# Patient Record
Sex: FEMALE | Race: WHITE | URBAN - METROPOLITAN AREA
[De-identification: names, ages, dates, MRNs, and addresses within clinical notes are randomized per-mention and may not be internally consistent; named-entity substitution may affect disease eponyms.]

---

## 2018-12-13 ENCOUNTER — OFFICE VISIT (OUTPATIENT)
Dept: ORTHOPEDIC SURGERY | Age: 68
End: 2018-12-13

## 2018-12-13 VITALS
HEIGHT: 70 IN | OXYGEN SATURATION: 95 % | DIASTOLIC BLOOD PRESSURE: 74 MMHG | WEIGHT: 144.6 LBS | HEART RATE: 76 BPM | RESPIRATION RATE: 16 BRPM | TEMPERATURE: 98 F | SYSTOLIC BLOOD PRESSURE: 132 MMHG | BODY MASS INDEX: 20.7 KG/M2

## 2018-12-13 DIAGNOSIS — M79.18 MYOFASCIAL PAIN: ICD-10-CM

## 2018-12-13 DIAGNOSIS — M47.816 LUMBAR SPONDYLOSIS: Primary | ICD-10-CM

## 2018-12-13 DIAGNOSIS — M35.3 PMR (POLYMYALGIA RHEUMATICA) (HCC): ICD-10-CM

## 2018-12-13 DIAGNOSIS — M51.36 BULGE OF LUMBAR DISC WITHOUT MYELOPATHY: ICD-10-CM

## 2018-12-13 RX ORDER — DEXTROMETHORPHAN HYDROBROMIDE, GUAIFENESIN 5; 100 MG/5ML; MG/5ML
650 LIQUID ORAL EVERY 8 HOURS
COMMUNITY

## 2018-12-13 RX ORDER — FLUTICASONE PROPIONATE 50 MCG
2 SPRAY, SUSPENSION (ML) NASAL DAILY
COMMUNITY

## 2018-12-13 RX ORDER — PREDNISONE 20 MG/1
15 TABLET ORAL
COMMUNITY

## 2018-12-13 NOTE — PATIENT INSTRUCTIONS
Low Back Arthritis: Exercises  Your Care Instructions  Here are some examples of typical rehabilitation exercises for your condition. Start each exercise slowly. Ease off the exercise if you start to have pain. Your doctor or physical therapist will tell you when you can start these exercises and which ones will work best for you. When you are not being active, find a comfortable position for rest. Some people are comfortable on the floor or a medium-firm bed with a small pillow under their head and another under their knees. Some people prefer to lie on their side with a pillow between their knees. Don't stay in one position for too long. Take short walks (10 to 20 minutes) every 2 to 3 hours. Avoid slopes, hills, and stairs until you feel better. Walk only distances you can manage without pain, especially leg pain. How to do the exercises  Pelvic tilt    1. Lie on your back with your knees bent. 2. \"Brace\" your stomach--tighten your muscles by pulling in and imagining your belly button moving toward your spine. 3. Press your lower back into the floor. You should feel your hips and pelvis rock back. 4. Hold for 6 seconds while breathing smoothly. 5. Relax and allow your pelvis and hips to rock forward. 6. Repeat 8 to 12 times. Back stretches    1. Get down on your hands and knees on the floor. 2. Relax your head and allow it to droop. Round your back up toward the ceiling until you feel a nice stretch in your upper, middle, and lower back. Hold this stretch for as long as it feels comfortable, or about 15 to 30 seconds. 3. Return to the starting position with a flat back while you are on your hands and knees. 4. Let your back sway by pressing your stomach toward the floor. Lift your buttocks toward the ceiling. 5. Hold this position for 15 to 30 seconds. 6. Repeat 2 to 4 times. Follow-up care is a key part of your treatment and safety.  Be sure to make and go to all appointments, and call your doctor if you are having problems. It's also a good idea to know your test results and keep a list of the medicines you take. Where can you learn more? Go to http://murali-irina.info/. Enter H044 in the search box to learn more about \"Low Back Arthritis: Exercises. \"  Current as of: November 29, 2017  Content Version: 11.8  © 4156-9454 [employer]. Care instructions adapted under license by [employer] (which disclaims liability or warranty for this information). If you have questions about a medical condition or this instruction, always ask your healthcare professional. [address] any warranty or liability for your use of this information.

## 2018-12-13 NOTE — PROGRESS NOTES
[address] [place] [address] [facility]  [address] [place], [address]  Phone: [phone]  Fax: [phone]    NEW PATIENT  Pt's YOB: 1950    ASSESSMENT   Diagnoses and all orders for this visit:    1. Lumbar spondylosis    2. PMR (polymyalgia rheumatica) ([facility])    3. Myofascial pain    4. Bulge of lumbar disc without myelopathy         IMPRESSION AND PLAN:  Nicole Main is a  female with history of lumbar pain. She was diagnosed with polymyalgia rheumatica by Dr. Ani Trevino and reports substantial improvement since starting prednisone 20 mg 3 weeks ago. Prior to starting the prednisone she tried physical therapy with occasional increased pain after sessions. 1) Pt was given information on lumbar arthritis exercises. 2) She was given information on how to use a TheraCane. 3) I recommended the patient try water exercise and encouraged her to continue with yoga. 4) Ms. Mary Umana has a reminder for a \"due or due soon\" health maintenance. I have asked that she contact her primary care provider, Amaryllis Burkitt, DO, for follow-up on this health maintenance. 5)  demonstrated consistency with prescribing. 6) Pt will follow-up in 6 weeks or sooner if needed. HISTORY OF PRESENT ILLNESS:  Nicole Main is a  female with history of lumbar pain. Pt presents to the office today as a new patient referred by Amaryllis Burkitt, DO. She reports lower back pain since the beginning of 2018. She notes that her pain started in the posterior aspect of both knees. She then noticed the onset of pain in both hips and in the lower back that was worse when sitting for prolonged periods of time. Pt notes that she was diagnosed with polymyalgia rheumatica by Dr. Ani Trevino and was placed on prednisone 20 mg 3 weeks ago. She notes significant improvement with the prednisone and will follow up with Dr. Ani Trevino tomorrow. Pt states that today has been one of her best days.  She notes that prior to starting the prednisone she had difficulty with overhead motion. Pt states that her  had to put all her dishes on the counter since she had pain when reaching in her kitchen cabinets. She notes that she was sleeping on her couch due to pain when changing position. Pt was also unable to get out of a car without severe pain. She tried physical therapy but notes that occasionally her pain was worse after sessions. Pt reports benefit with moist head. Of note, she had prior cervical surgery with Dr. Daniel Lang in 2006. Pt notes that she performs yoga and generally walks 3,500-5,000 steps a day. She has been followed by Dr. Ang Barcenas for breast cancer and notes a history of osteopenia. She reports intermittent headaches and admits to recent jaw pain. Pt denies any changes in vision. Pt at this time desires to proceed with information on how to use a TheraCane. Pain Scale: 1/10     PCP: Keena Tatum DO    Past Medical History:   Diagnosis Date    Arthritis     Polymyalgia rheumatica    Cancer ([address]     Breast Cancer - in remission currently    Kidney disease         Social History     Socioeconomic History    Marital status:      Spouse name: Not on file    Number of children: Not on file    Years of education: Not on file    Highest education level: Not on file   Social Needs    Financial resource strain: Not on file    Food insecurity - worry: Not on file    Food insecurity - inability: Not on file    Transportation needs - medical: Not on file   [employer] needs - non-medical: Not on file   Occupational History    Not on file   Tobacco Use    Smoking status: Never Smoker   Substance and Sexual Activity    Alcohol use:  Yes    Drug use: Not on file    Sexual activity: Not on file   Other Topics Concern     Service Not Asked    Blood Transfusions Not Asked    Caffeine Concern Not Asked    Occupational Exposure Not Asked    Hobby Hazards Not Asked    Sleep Concern Not Asked    Stress Concern Not Asked    Weight Concern Not Asked    Special Diet Not Asked    Back Care Not Asked    Exercise Not Asked    Bike Helmet Not Asked   [address] Not Asked    Self-Exams Not Asked   Social History Narrative    Not on file       Current Outpatient Medications   Medication Sig Dispense Refill    predniSONE (DELTASONE) 20 mg tablet Take 20 mg by mouth daily (with breakfast).  ubidecarenone/vitamin E mixed (COQ10  PO) Take  by mouth.  cyanocobalamin, vitamin B-12, (B-12 KIT) 1,000 mcg/mL kit by Injection route.  polyethylene glycol 3350 (CLEARLAX PO) Take  by mouth as needed.  acetaminophen (TYLENOL ARTHRITIS PAIN) 650 mg TbER Take 650 mg by mouth every eight (8) hours.  calcium carbonate-simethicone 750-80 mg chew Take  by mouth.  fluticasone (FLONASE ALLERGY RELIEF) 50 mcg/actuation nasal spray 2 Sprays by Both Nostrils route daily.  denosumab (PROLIA) 60 mg/mL injection 60 mg by SubCUTAneous route.  sodium chloride (ONUR 128) 2 % ophthalmic solution Administer 1 Drop to both eyes as needed.  PITAVASTATIN CALCIUM (LIVALO PO) Take  by mouth.  EXEMESTANE PO Take  by mouth.  MONTELUKAST SODIUM (SINGULAIR PO) Take  by mouth.  TRAZODONE HCL (TRAZODONE PO) Take  by mouth.  ibandronate (BONIVA) 150 mg tablet Take 150 mg by mouth every thirty (30) days.  Cholecalciferol, Vitamin D3, (VITAMIN D3) 1,000 unit cap Take  by mouth.  tamsulosin (FLOMAX) 0.4 mg capsule Take 1 Cap by mouth daily (after dinner). 30 Cap 0       Allergies   Allergen Reactions    Avelox [Moxifloxacin] Unknown (comments)    Estrogens Other (comments)     Estrogen positive    Percocet [Oxycodone-Acetaminophen] Nausea and Vomiting    Sulfa (Sulfonamide Antibiotics) Hives       REVIEW OF SYSTEMS    Constitutional: Negative for fever, chills, or weight change. Respiratory: Negative for cough or shortness of breath. Cardiovascular: Negative for chest pain or palpitations. Gastrointestinal: Negative for acid reflux, change in bowel habits, or constipation. Genitourinary: Negative for dysuria and flank pain. Musculoskeletal: Positive for lumbar pain. Skin: Negative for rash. Neurological: Negative for headaches, dizziness, or numbness. Endo/Heme/Allergies: Negative for increased bruising. Psychiatric/Behavioral: Negative for difficulty with sleep. PHYSICAL EXAMINATION  Visit Vitals  /74 (BP 1 Location: Right arm, BP Patient Position: Sitting)   Pulse 76   Temp 98 °F (36.7 °C) (Oral)   Resp 16   Ht 5' 10\" (1.778 m)   Wt 144 lb 9.6 oz (65.6 kg)   SpO2 95%   BMI 20.75 kg/m²       Constitutional: Awake, alert, and in no acute distress. HEENT: Normocephalic. Atraumatic. Oropharynx is moist and clear. PERRL. EOMI. Sclerae are nonicteric  Heart: Regular rate and rhythm  Lungs: Clear to auscultation bilaterally  Abdomen: Soft and nontender. Bowel sounds are present  Neurological: 1+ symmetrical DTRs in the upper extremities. 1+ symmetrical DTRs in the lower extremities. Sensation to light touch is intact. Negative Nancy's sign bilaterally. Skin: warm, dry, and intact. Musculoskeletal: Tight across the upper trapezii with scattered trigger points. Decreased range of motion with side to side cervical flexion. No pain with extension, axial loading, or forward flexion. No pain with internal or external rotation of her hips. Negative straight leg raise bilaterally.        Biceps  Triceps Deltoids Wrist Ext Wrist Flex Hand Intrin   Right +4/5 +4/5 +4/5 +4/5 +4/5 +4/5   Left +4/5 +4/5 +4/5 +4/5 +4/5 +4/5      Hip Flex  Quads Hamstrings Ankle DF EHL Ankle PF   Right +4/5 +4/5 +4/5 +4/5 +4/5 +4/5   Left +4/5 +4/5 +4/5 +4/5 +4/5 +4/5     IMAGING:    Lumbar spine MRI from 09/28/2018 was personally reviewed with the patient and demonstrated:  FINDINGS:    Alignment: There is retrolisthesis of L3 with respect L4 and L4 with respect L5 by 3 mm. Vertebral bodies: No compression fractures. L5-S1: Mild intervertebral disc desiccation is seen. A mild disc protrusion extends into the anterior epidural space and contributes to mild flattening of the ventral thecal sac. No displacement emerging S1 nerve roots noted. Mild bilateral facet arthropathy noted. The findings contribute to mild bilateral foraminal narrowing without central stenosis. L4-5: Retrolisthesis noted. Intervertebral disc desiccation. A mild to moderate diffuse disc protrusion contributes to flattening of the ventral thecal sac. Mild bilateral facet arthropathy noted in addition to flavum ligamentum hypertrophy. The findings contribute to mild to moderate bilateral foraminal stenosis and borderline mild central stenosis as the thecal sac measures 9-10 mm in the AP dimension. L3-4: Retrolisthesis noted. Mild intervertebral disc narrowing desiccation is seen. A mild to moderate diffuse disc protrusion contributes to flattening of the ventral thecal sac. Mild bilateral facet arthropathy noted. The findings contribute to mild to moderate bilateral foraminal stenosis without significant central stenosis. L2-3: MiId intervertebral disc narrowing and desiccation is seen. Mild disc bulging is notes. There is a moderate left posterolateral to lateral disc protrusion noted. The above findings extend into the anterior epidural space and contribute to flattening of the central thecal sac greatest anterolaterally on the left. The findings contribute to moderate left lateral recess stenosis. The findings contribute to mild to moderate left foraminal stenosis. No central spinal contralateral foraminal stenosis is seen. L1-2: Intervertebral disc narrowing and desiccation is notes. A moderate right posterolateral disc protrusion notes.  This extends into the anterior epidural space and contributes to flattening of the ventral thecal sac greatest anterolaterally on the right. The findings contribute to mild to moderate right lateral recess stenosis. No central spinal or foraminal stenosis is noted. Conus termination: L1-L2    Lower thoracic spine: Within normal limits. Upper sacrum: Within normal limits. Miscellaneous: Diverticular of the sigmoid colon noted suggesting sigmoid diverticulosis. IMPRESSION:  Disc degenerative changes of the lumbar spine described above in detail. Written by Graham Storm, as dictated by Lance Carrasco MD.  I, Dr. Lance Carrasco confirm that all documentation is accurate.

## 2019-01-24 ENCOUNTER — OFFICE VISIT (OUTPATIENT)
Dept: ORTHOPEDIC SURGERY | Age: 69
End: 2019-01-24

## 2019-01-24 VITALS
SYSTOLIC BLOOD PRESSURE: 123 MMHG | OXYGEN SATURATION: 96 % | DIASTOLIC BLOOD PRESSURE: 71 MMHG | TEMPERATURE: 97.9 F | BODY MASS INDEX: 21.33 KG/M2 | RESPIRATION RATE: 16 BRPM | HEIGHT: 70 IN | WEIGHT: 149 LBS | HEART RATE: 78 BPM

## 2019-01-24 DIAGNOSIS — Z85.3 HISTORY OF LEFT BREAST CANCER: ICD-10-CM

## 2019-01-24 DIAGNOSIS — M79.18 MYOFASCIAL PAIN: ICD-10-CM

## 2019-01-24 DIAGNOSIS — M35.3 PMR (POLYMYALGIA RHEUMATICA) (HCC): ICD-10-CM

## 2019-01-24 DIAGNOSIS — M47.816 LUMBAR SPONDYLOSIS: Primary | ICD-10-CM

## 2019-01-24 DIAGNOSIS — M85.80 OSTEOPENIA, UNSPECIFIED LOCATION: ICD-10-CM

## 2019-01-24 NOTE — PATIENT INSTRUCTIONS
Low Back Arthritis: Exercises  Your Care Instructions  Here are some examples of typical rehabilitation exercises for your condition. Start each exercise slowly. Ease off the exercise if you start to have pain. Your doctor or physical therapist will tell you when you can start these exercises and which ones will work best for you. When you are not being active, find a comfortable position for rest. Some people are comfortable on the floor or a medium-firm bed with a small pillow under their head and another under their knees. Some people prefer to lie on their side with a pillow between their knees. Don't stay in one position for too long. Take short walks (10 to 20 minutes) every 2 to 3 hours. Avoid slopes, hills, and stairs until you feel better. Walk only distances you can manage without pain, especially leg pain. How to do the exercises  Pelvic tilt    1. Lie on your back with your knees bent. 2. \"Brace\" your stomach--tighten your muscles by pulling in and imagining your belly button moving toward your spine. 3. Press your lower back into the floor. You should feel your hips and pelvis rock back. 4. Hold for 6 seconds while breathing smoothly. 5. Relax and allow your pelvis and hips to rock forward. 6. Repeat 8 to 12 times. Back stretches    1. Get down on your hands and knees on the floor. 2. Relax your head and allow it to droop. Round your back up toward the ceiling until you feel a nice stretch in your upper, middle, and lower back. Hold this stretch for as long as it feels comfortable, or about 15 to 30 seconds. 3. Return to the starting position with a flat back while you are on your hands and knees. 4. Let your back sway by pressing your stomach toward the floor. Lift your buttocks toward the ceiling. 5. Hold this position for 15 to 30 seconds. 6. Repeat 2 to 4 times. Follow-up care is a key part of your treatment and safety.  Be sure to make and go to all appointments, and call your doctor if you are having problems. It's also a good idea to know your test results and keep a list of the medicines you take. Where can you learn more? Go to http://murali-irina.info/. Enter T070 in the search box to learn more about \"Low Back Arthritis: Exercises. \"  Current as of: September 20, 2018  Content Version: 11.9  © 6422-7231 [employer]. Care instructions adapted under license by [employer] (which disclaims liability or warranty for this information). If you have questions about a medical condition or this instruction, always ask your healthcare professional. [address] any warranty or liability for your use of this information.

## 2019-01-24 NOTE — PROGRESS NOTES
[facility]  [address] [place], [address]  Phone: [phone]  Fax: [phone]    Pt's YOB: 1950    ASSESSMENT   Diagnoses and all orders for this visit:    1. Lumbar spondylosis    2. PMR (polymyalgia rheumatica) ([facility])    3. Myofascial pain    4. Osteopenia, unspecified location    5. History of left breast cancer        IMPRESSION AND PLAN:  Adrian Ramsay is a 79 y.o. female with history of lumbar pain and polymyalgia rheumatica. She notes that overall she is doing well. Pt decreased her prednisone from 20 mg to 15 mg daily since her last office visit. She uses ice and moist heat as needed and admits that she has started to wean herself off using a heating pad. Pt will follow up with her rheumatologist, Dr. Grecia Ambrosio next week. 1) Pt was given information on lumbar arthritis exercises. 2) She will continue taking prednisone as prescribed. 3) Discussed treatment options with the Pt, including steroid injections. 4) Ms. Steve Mireles has a reminder for a \"due or due soon\" health maintenance. I have asked that she contact her primary care provider, Fredna Goldberg, DO, for follow-up on this health maintenance. 5)  demonstrated consistency with prescribing. 6) Pt will follow-up as needed. HISTORY OF PRESENT ILLNESS:  Adrian Ramsay is a 79 y.o. female with history of lumbar pain and polymyalgia rheumatica. She notes that overall she is doing well at this time. Pt denies any pain radiating down the legs at this time. She decreased her prednisone from 20 mg to 15 mg daily since her last office visit. Pt notes a significant improvement in her stiffness since exercising along to exercise videos every morning. She also likes to perform yoga. Of note, her dad is on hospice care at [facility] and she admits that it takes a toll on her driving to and from [facility] everyday.  Pt notes that she had cut back on her housework and her  has been helping out more. She reports pain in the buttock a couple days and is unsure if this is weather related. Pt notes that she has been able to attend Buddhism again and often brings her own cushion/pillow. Pt notes that her pain was debilitating at its onset so she reports significant improvement overall. She uses ice and moist heat as needed and admits that she has started to wean herself off using a heating pad. Pt will follow up with her rheumatologist, Dr. Pradeep Solis, next week. She is also undergoing Prolia treatments, will have another Prolia injection tomorrow, and so far has had a total of 4 Prolia injections. Of note, she was on Boniva for quite some time prior to starting the Prolia injections. She takes Tylenol as needed with benefit. Pt at this time desires to continue with current care. More than 25 minute was spent with patient reviewing her symptoms, exercise regimen, medical follow up appointments, current treatment for osteoporosis, follow up for breast cancer and treatment for PMR. Pain Scale: 0 - No pain/10    PCP: Susana Quintero DO     Past Medical History:   Diagnosis Date    Arthritis     Polymyalgia rheumatica    Cancer ([facility] [address]     Breast Cancer - in remission currently    Kidney disease     PMR (polymyalgia rheumatica) ([facility])         Social History     Socioeconomic History    Marital status:      Spouse name: Not on file    Number of children: Not on file    Years of education: Not on file    Highest education level: Not on file   Social Needs    Financial resource strain: Not on file    Food insecurity - worry: Not on file    Food insecurity - inability: Not on file   [employer] needs - medical: Not on file   [employer] needs - non-medical: Not on file   Occupational History    Not on file   Tobacco Use    Smoking status: Never Smoker    Smokeless tobacco: Never Used   Substance and Sexual Activity    Alcohol use:  Yes    Drug use: Not on file    Sexual activity: Not on file   Other Topics Concern     Service Not Asked    Blood Transfusions Not Asked    Caffeine Concern Not Asked    Occupational Exposure Not Asked    Hobby Hazards Not Asked    Sleep Concern Not Asked    Stress Concern Not Asked    Weight Concern Not Asked    Special Diet Not Asked    Back Care Not Asked    Exercise Not Asked    Bike Helmet Not Asked   [address] Not Asked    Self-Exams Not Asked   Social History Narrative    Not on file       Current Outpatient Medications   Medication Sig Dispense Refill    predniSONE (DELTASONE) 20 mg tablet Take 15 mg by mouth daily (with breakfast).  cyanocobalamin, vitamin B-12, (B-12 KIT) 1,000 mcg/mL kit by Injection route.  acetaminophen (TYLENOL ARTHRITIS PAIN) 650 mg TbER Take 650 mg by mouth every eight (8) hours.  calcium carbonate-simethicone 750-80 mg chew Take  by mouth.  fluticasone (FLONASE ALLERGY RELIEF) 50 mcg/actuation nasal spray 2 Sprays by Both Nostrils route daily.  denosumab (PROLIA) 60 mg/mL injection 60 mg by SubCUTAneous route.  sodium chloride (ONUR 128) 2 % ophthalmic solution Administer 1 Drop to both eyes as needed.  PITAVASTATIN CALCIUM (LIVALO PO) Take  by mouth.  EXEMESTANE PO Take  by mouth.  MONTELUKAST SODIUM (SINGULAIR PO) Take  by mouth.  TRAZODONE HCL (TRAZODONE PO) Take  by mouth.  Cholecalciferol, Vitamin D3, (VITAMIN D3) 1,000 unit cap Take  by mouth.  ubidecarenone/vitamin E mixed (COQ10  PO) Take  by mouth.  polyethylene glycol 3350 (CLEARLAX PO) Take  by mouth as needed.  ibandronate (BONIVA) 150 mg tablet Take 150 mg by mouth every thirty (30) days.  tamsulosin (FLOMAX) 0.4 mg capsule Take 1 Cap by mouth daily (after dinner).  30 Cap 0       Allergies   Allergen Reactions    Avelox [Moxifloxacin] Unknown (comments)    Estrogens Other (comments)     Estrogen positive    Percocet [Oxycodone-Acetaminophen] Nausea and Vomiting    Sulfa (Sulfonamide Antibiotics) Hives         REVIEW OF SYSTEMS    Constitutional: Negative for fever, chills, or weight change. Respiratory: Negative for cough or shortness of breath. Cardiovascular: Negative for chest pain or palpitations. Gastrointestinal: Negative for acid reflux, change in bowel habits, or constipation. Genitourinary: Negative for dysuria and flank pain. Musculoskeletal: Positive for lumbar pain. Skin: Negative for rash. Neurological: Negative for headaches, dizziness, or numbness. Endo/Heme/Allergies: Negative for increased bruising. Psychiatric/Behavioral: Negative for difficulty with sleep. PHYSICAL EXAMINATION  Visit Vitals  /71   Pulse 78   Temp 97.9 °F (36.6 °C) (Oral)   Resp 16   Ht 5' 10\" (1.778 m)   Wt 149 lb (67.6 kg)   SpO2 96%   BMI 21.38 kg/m²       Constitutional: Awake, alert, and in no acute distress. Neurological: 1+ symmetrical DTRs in the lower extremities. Sensation to light touch is intact. Skin: warm, dry, and intact. Musculoskeletal: Tenderness to palpation in the lower lumbar region. Moderate pain with extension and axial loading. No pain with internal or external rotation of her hips. Negative straight leg raise bilaterally      Biceps  Triceps Deltoids Wrist Ext Wrist Flex Hand Intrin   Right +4/5 +4/5 +4/5 +4/5 +4/5 +4/5   Left +4/5 +4/5 +4/5 +4/5 +4/5 +4/5      Hip Flex  Quads Hamstrings Ankle DF EHL Ankle PF   Right +4/5 +4/5 +4/5 +4/5 +4/5 +4/5   Left  4/5 +4/5 +4/5 +4/5 +4/5 +4/5     IMAGING:    Lumbar spine MRI from 09/28/2018 was personally reviewed with the patient and demonstrated:  FINDINGS:     Alignment: There is retrolisthesis of L3 with respect L4 and L4 with respect L5 by 3 mm.     Vertebral bodies: No compression fractures.     L5-S1: Mild intervertebral disc desiccation is seen.  A mild disc protrusion extends into the anterior epidural space and contributes to mild flattening of the ventral thecal sac. No displacement emerging S1 nerve roots noted. Mild bilateral facet arthropathy noted. The findings contribute to mild bilateral foraminal narrowing without central stenosis.      L4-5: Retrolisthesis noted. Intervertebral disc desiccation. A mild to moderate diffuse disc protrusion contributes to flattening of the ventral thecal sac. Mild bilateral facet arthropathy noted in addition to flavum ligamentum hypertrophy. The findings contribute to mild to moderate bilateral foraminal stenosis and borderline mild central stenosis as the thecal sac measures 9-10 mm in the AP dimension.      L3-4: Retrolisthesis noted. Mild intervertebral disc narrowing desiccation is seen. A mild to moderate diffuse disc protrusion contributes to flattening of the ventral thecal sac. Mild bilateral facet arthropathy noted. The findings contribute to mild to moderate bilateral foraminal stenosis without significant central stenosis.      L2-3: MiId intervertebral disc narrowing and desiccation is seen. Mild disc bulging is notes. There is a moderate left posterolateral to lateral disc protrusion noted. The above findings extend into the anterior epidural space and contribute to flattening of the central thecal sac greatest anterolaterally on the left. The findings contribute to moderate left lateral recess stenosis. The findings contribute to mild to moderate left foraminal stenosis. No central spinal contralateral foraminal stenosis is seen.      L1-2: Intervertebral disc narrowing and desiccation is notes. A moderate right posterolateral disc protrusion notes. This extends into the anterior epidural space and contributes to flattening of the ventral thecal sac greatest anterolaterally on the right. The findings contribute to mild to moderate right lateral recess stenosis.  No central spinal or foraminal stenosis is noted.     Conus termination: L1-L2     Lower thoracic spine: Within normal limits.     Upper sacrum: Within normal limits.     Miscellaneous: Diverticular of the sigmoid colon noted suggesting sigmoid diverticulosis.      IMPRESSION:  Disc degenerative changes of the lumbar spine described above in detail. Written by Obdulia Hogan MD, [address], as dictated by Kerri Iyer MD.  I, Dr. Kerri Iyer confirm that all documentation is accurate.

## 2019-12-26 ENCOUNTER — OFFICE VISIT (OUTPATIENT)
Dept: ORTHOPEDIC SURGERY | Age: 69
End: 2019-12-26

## 2019-12-26 VITALS
OXYGEN SATURATION: 94 % | BODY MASS INDEX: 23.28 KG/M2 | RESPIRATION RATE: 16 BRPM | TEMPERATURE: 99 F | DIASTOLIC BLOOD PRESSURE: 112 MMHG | HEIGHT: 70 IN | HEART RATE: 97 BPM | SYSTOLIC BLOOD PRESSURE: 133 MMHG | WEIGHT: 162.6 LBS

## 2019-12-26 DIAGNOSIS — M51.34 DDD (DEGENERATIVE DISC DISEASE), THORACIC: Primary | ICD-10-CM

## 2019-12-26 DIAGNOSIS — R03.0 ELEVATED BLOOD PRESSURE READING: ICD-10-CM

## 2019-12-26 DIAGNOSIS — M47.816 LUMBAR SPONDYLOSIS: ICD-10-CM

## 2019-12-26 DIAGNOSIS — M35.3 PMR (POLYMYALGIA RHEUMATICA) (HCC): ICD-10-CM

## 2019-12-26 DIAGNOSIS — M85.852 OSTEOPENIA OF LEFT HIP: ICD-10-CM

## 2019-12-26 DIAGNOSIS — M79.18 MYOFASCIAL PAIN: ICD-10-CM

## 2019-12-26 NOTE — PROGRESS NOTES
[facility]  [address] [place], [facility] [address]  Phone: [phone]  Fax: [phone]    Pt's YOB: 1950    ASSESSMENT   Diagnoses and all orders for this visit:    1. DDD (degenerative disc disease), thoracic    2. Lumbar spondylosis    3. Myofascial pain    4. PMR (polymyalgia rheumatica) ([facility])    5. Osteopenia of left hip    6. Elevated blood pressure reading         IMPRESSION AND PLAN:  Eddie Goldberg is a 81 y.o. female with history of lumbar pain. She reports constant soreness across the lower back/right buttock. Pt uses ice or heat on her lower back as needed when her pain becomes more severe. She notes that about 3 weeks ago her prednisone was increased to 15 mg when her pain became more severe but she has since tapered down to 9 mg daily. 1) Pt was given information on upper back and lumbar arthritis exercises. 2) She was given information on how to use a TheraCane. 3)  I recommended the patient try armando chi and encouraged her to continue with gentle yoga. 4) Ms. Jeff Martin has a reminder for a \"due or due soon\" health maintenance. I have asked that she contact her primary care provider, Chriss Guzman DO, for follow-up on this health maintenance and monitoring of her blood pressure. 5)  demonstrated consistency with prescribing. 6) Pt will continue with her current exercise regimen and defers on therapy  7) Pt will bring in CD with thoracic MRI images -- report reviewed with her today in the office (she did not receive the CD after getting the MRI). Follow-up and Dispositions    · Return in about 6 weeks (around 2/6/2020) for follow up. HISTORY OF PRESENT ILLNESS:  Eddie Goldberg is a 81 y.o. female with history of lumbar pain and presents to the office today for follow up. Pt was last seen in 01/24/2019. She reports constant soreness across the lower back/right buttock.  Pt uses ice or heat on her lower back as needed when her pain becomes more severe. She admits to occasional weakness in the thighs, R>L. Pt denies any difficulty breathing but notes occasional soreness in her ribs in the morning upon deep inspiration. She complains of pain in the posterior aspect of the left knee. Pt notes that she had a Baker's cyst on the right in the past which improved gradually on its own. She notes that she generally sleeps with a pillow between her knees. Pt reports some improvement in her polymyalgia rheumatica since her last office visit. She notes that about 3 weeks ago her prednisone was increased to 15 mg when her pain became more severe but she has since tapered down to 9 mg daily. Pt admits that her pain worsens if she misses a dose of prednisone by a couple hours. Of note, she is followed by Dr. Brook South. Pt reports improvement when using yoga and walking exercise videos. Pt at this time desires to proceed with home exercise. 25 minutes of face to face contact was spent with the patient during today's office visit extensively discussing her symptoms, medication (including varying doses of prednisone), therapy, use of weights and injections and current treatment plan.       Pain Scale: /10    PCP: Claudio Chan DO     Past Medical History:   Diagnosis Date    Arthritis     Polymyalgia rheumatica    Cancer ([facility] [address]     Breast Cancer - in remission currently    Kidney disease     PMR (polym[facility] rheumatica) ([facility])         Social History     Socioeconomic History    Marital status:      Spouse name: Not on file    Number of children: Not on file    Years of education: Not on file    Highest education level: Not on file   Occupational History    Not on file   Social Needs    Financial resource strain: Not on file    Food insecurity:     Worry: Not on file     Inability: Not on file    Transportation needs:     Medical: Not on file     Non-medical: Not on file   Tobacco Use    Smoking status: Never Smoker    Smokeless tobacco: Never Used   Substance and Sexual Activity    Alcohol use: Yes    Drug use: Not on file    Sexual activity: Not on file   Lifestyle    Physical activity:     Days per week: Not on file     Minutes per session: Not on file    Stress: Not on file   Relationships    Social connections:     Talks on phone: Not on file     Gets together: Not on file     Attends Denominational service: Not on file     Active member of club or organization: Not on file     Attends meetings of clubs or organizations: Not on file     Relationship status: Not on file    Intimate partner violence:     Fear of current or ex partner: Not on file     Emotionally abused: Not on file     Physically abused: Not on file     Forced sexual activity: Not on file   Other Topics Concern     Service Not Asked    Blood Transfusions Not Asked    Caffeine Concern Not Asked    Occupational Exposure Not Asked   [employer] Hazards Not Asked    Sleep Concern Not Asked    Stress Concern Not Asked    Weight Concern Not Asked    Special Diet Not Asked    Back Care Not Asked    Exercise Not Asked    Bike Helmet Not Asked   [address] Not Asked    Self-Exams Not Asked   Social History Narrative    Not on file       Current Outpatient Medications   Medication Sig Dispense Refill    cyanocobalamin, vitamin B-12, (B-12 KIT) 1,000 mcg/mL kit by Injection route.  calcium carbonate-simethicone 750-80 mg chew Take  by mouth.  fluticasone (FLONASE ALLERGY RELIEF) 50 mcg/actuation nasal spray 2 Sprays by Both Nostrils route daily.  denosumab (PROLIA) 60 mg/mL injection 60 mg by SubCUTAneous route.  sodium chloride (ONUR 128) 2 % ophthalmic solution Administer 1 Drop to both eyes as needed.  PITAVASTATIN CALCIUM (LIVALO PO) Take  by mouth.  EXEMESTANE PO Take  by mouth.  MONTELUKAST SODIUM (SINGULAIR PO) Take  by mouth.  TRAZODONE HCL (TRAZODONE PO) Take  by mouth.       Cholecalciferol, Vitamin D3, (VITAMIN D3) 1,000 unit cap Take  by mouth.  predniSONE (DELTASONE) 20 mg tablet Take 15 mg by mouth daily (with breakfast).  ubidecarenone/vitamin E mixed (COQ10  PO) Take  by mouth.  polyethylene glycol 3350 (CLEARLAX PO) Take  by mouth as needed.  acetaminophen (TYLENOL ARTHRITIS PAIN) 650 mg TbER Take 650 mg by mouth every eight (8) hours.  ibandronate (BONIVA) 150 mg tablet Take 150 mg by mouth every thirty (30) days.  tamsulosin (FLOMAX) 0.4 mg capsule Take 1 Cap by mouth daily (after dinner). 30 Cap 0       Allergies   Allergen Reactions    Avelox [Moxifloxacin] Unknown (comments)    Estrogens Other (comments)     Estrogen positive    Percocet [Oxycodone-Acetaminophen] Nausea and Vomiting    Sulfa (Sulfonamide Antibiotics) Hives         REVIEW OF SYSTEMS    Constitutional: Negative for fever, chills, or weight change. Respiratory: Negative for cough or shortness of breath. Cardiovascular: Negative for chest pain or palpitations. Gastrointestinal: Negative for acid reflux, change in bowel habits, or constipation. Genitourinary: Negative for dysuria and flank pain. Musculoskeletal: Positive for lumbar pain. Skin: Negative for rash. Neurological: Negative for headaches, dizziness, or numbness. Endo/Heme/Allergies: Negative for increased bruising. Psychiatric/Behavioral: Negative for difficulty with sleep. PHYSICAL EXAMINATION  Visit Vitals  BP (!) 133/112 (BP 1 Location: Right arm, BP Patient Position: Sitting)   Pulse 97   Temp 99 °F (37.2 °C) (Oral)   Resp 16   Ht 5' 10\" (1.778 m)   Wt 162 lb 9.6 oz (73.8 kg)   SpO2 94%   BMI 23.33 kg/m²       Constitutional: Awake, alert, and in no acute distress. Neurological: 1+ symmetrical DTRs in the lower extremities. Sensation to light touch is intact. Skin: warm, dry, and intact. Musculoskeletal: Tight across the upper trapezius with tenderness to palpation.  No pain with extension, axial loading, or forward flexion. Decreased range of motion and mild pain with internal rotation of her right hip; good range of motion on the left. Negative straight leg raise bilaterally. Hip Flex  Quads Hamstrings Ankle DF EHL Ankle PF   Right +4/5 +4/5 +4/5 +4/5 +4/5 +4/5   Left +4/5 +4/5 +4/5 +4/5 +4/5 +4/5     IMAGING:    Thoracic spine MRI from 12/18/2019 was personally reviewed with the patient and demonstrated:  FINDINGS:  Alignment is anatomic.      POSTOP:  None    DISC LEVELS:    At T2-3, T3-4 minimal disc bulging    At T4-5, small posterocentral protruded disc herniation with contact and minimal flattening of the ventral cord but no abnormal cord signal.    At T5-6, minimal posterocentral protruded disc herniation with contact and minimal flattening of the ventral cord but no abnormal cord signal    At T6-7, small left paracentral protruded disc herniation with contact and minimal flattening of the left ventral cord but no abnormal cord signal    At T7-8, small left posterolateral protruded disc herniation with contact and minimal flattening of the left ventral cord but no abnormal cord signal    At T8-9, minimal bilobate left and right posterolateral protruded disc herniation with contact and minimal flattening of right and left ventral lateral cord but no abnormal cord signal    At T9-10,minimal bilobate left and right posterolateral protruded disc herniation with contact but no definite cord deformity    At T10-11, small bilobate left and right posterolateral protruded disc herniation with contact and minimal flattening of right and left ventral lateral cord but no abnormal cord signal    At T11-12, minimal right paracentral protruded disc herniation without cord contact or deformity. At T12-L1, minimal posterocentral protruded disc herniation without cord contact or deformity. Otherwise unremarkable discs and facet joints.  No canal stenosis    THORACIC CORD:  Thoracic cord is of otherwise normal caliber and signal.     MISC:  Marrow signal is unremarkable for age and gender. Based on localizer not well defined, cervical discectomy and fusion C5-6, C6-7, with anterior cervical fixation plate [id number]    Paraspinal musculature is unremarkable in caliber and signal.     Normal caliber aorta, as visible. IMPRESSION:  1.  Multilevel low-grade minimal to small protruded disc herniations, with minimal cord flattening deformity but no abnormal cord signal at any level, further detailed above. 2. C5-6 and C6-7 discectomy and fixation. Lumbar spine MRI from 09/28/2018 was personally reviewed with the patient and demonstrated:  FINDINGS:     Alignment: There is retrolisthesis of L3 with respect L4 and L4 with respect L5 by 3 mm.     Vertebral bodies: No compression fractures.     L5-S1: Mild intervertebral disc desiccation is seen. A mild disc protrusion extends into the anterior epidural space and contributes to mild flattening of the ventral thecal sac. No displacement emerging S1 nerve roots noted. Mild bilateral facet arthropathy noted. The findings contribute to mild bilateral foraminal narrowing without central stenosis.      L4-5: Retrolisthesis noted. Intervertebral disc desiccation. A mild to moderate diffuse disc protrusion contributes to flattening of the ventral thecal sac. Mild bilateral facet arthropathy noted in addition to flavum ligamentum hypertrophy. The findings contribute to mild to moderate bilateral foraminal stenosis and borderline mild central stenosis as the thecal sac measures 9-10 mm in the AP dimension.      L3-4: Retrolisthesis noted. Mild intervertebral disc narrowing desiccation is seen. A mild to moderate diffuse disc protrusion contributes to flattening of the ventral thecal sac. Mild bilateral facet arthropathy noted.  The findings contribute to mild to moderate bilateral foraminal stenosis without significant central stenosis.      L2-3: MiId intervertebral disc narrowing and desiccation is seen. Mild disc bulging is notes. There is a moderate left posterolateral to lateral disc protrusion noted. The above findings extend into the anterior epidural space and contribute to flattening of the central thecal sac greatest anterolaterally on the left. The findings contribute to moderate left lateral recess stenosis. The findings contribute to mild to moderate left foraminal stenosis. No central spinal contralateral foraminal stenosis is seen.      L1-2: Intervertebral disc narrowing and desiccation is notes. A moderate right posterolateral disc protrusion notes. This extends into the anterior epidural space and contributes to flattening of the ventral thecal sac greatest anterolaterally on the right. The findings contribute to mild to moderate right lateral recess stenosis. No central spinal or foraminal stenosis is noted.     Conus termination: L1-L2     Lower thoracic spine: Within normal limits.     Upper sacrum: Within normal limits.     Miscellaneous: Diverticular of the sigmoid colon noted suggesting sigmoid diverticulosis.      IMPRESSION:  Disc degenerative changes of the lumbar spine described above in detail. Written by Olivier Brunner, as dictated by Leonardo Swan MD.  I, Dr. Leonardo Swan confirm that all documentation is accurate.

## 2019-12-26 NOTE — PATIENT INSTRUCTIONS
Low Back Arthritis: Exercises  Introduction  Here are some examples of typical rehabilitation exercises for your condition. Start each exercise slowly. Ease off the exercise if you start to have pain. Your doctor or physical therapist will tell you when you can start these exercises and which ones will work best for you. When you are not being active, find a comfortable position for rest. Some people are comfortable on the floor or a medium-firm bed with a small pillow under their head and another under their knees. Some people prefer to lie on their side with a pillow between their knees. Don't stay in one position for too long. Take short walks (10 to 20 minutes) every 2 to 3 hours. Avoid slopes, hills, and stairs until you feel better. Walk only distances you can manage without pain, especially leg pain. How to do the exercises  Pelvic tilt    1. Lie on your back with your knees bent. 2. \"Brace\" your stomach--tighten your muscles by pulling in and imagining your belly button moving toward your spine. 3. Press your lower back into the floor. You should feel your hips and pelvis rock back. 4. Hold for 6 seconds while breathing smoothly. 5. Relax and allow your pelvis and hips to rock forward. 6. Repeat 8 to 12 times. Back stretches    1. Get down on your hands and knees on the floor. 2. Relax your head and allow it to droop. Round your back up toward the ceiling until you feel a nice stretch in your upper, middle, and lower back. Hold this stretch for as long as it feels comfortable, or about 15 to 30 seconds. 3. Return to the starting position with a flat back while you are on your hands and knees. 4. Let your back sway by pressing your stomach toward the floor. Lift your buttocks toward the ceiling. 5. Hold this position for 15 to 30 seconds. 6. Repeat 2 to 4 times. Follow-up care is a key part of your treatment and safety.  Be sure to make and go to all appointments, and call your doctor if you are having problems. It's also a good idea to know your test results and keep a list of the medicines you take. Where can you learn more? Go to http://murali-irina.info/. Enter Y531 in the search box to learn more about \"Low Back Arthritis: Exercises. \"  Current as of: June 26, 2019  Content Version: 12.2  © 6540-7472 [employer]. Care instructions adapted under license by [employer] (which disclaims liability or warranty for this information). If you have questions about a medical condition or this instruction, always ask your healthcare professional. [place] [address] any warranty or liability for your use of this information. Healthy Upper Back: Exercises  Introduction  Here are some examples of exercises for your upper back. Start each exercise slowly. Ease off the exercise if you start to have pain. Your doctor or physical therapist will tell you when you can start these exercises and which ones will work best for you. How to do the exercises  Lower neck and upper back stretch    7. Stretch your arms out in front of your body. Clasp one hand on top of your other hand. 8. Gently reach out so that you feel your shoulder blades stretching away from each other. 9. Gently bend your head forward. 10. Hold for 15 to 30 seconds. 11. Repeat 2 to 4 times. Midback stretch    7. Kneel on the floor, and sit back on your ankles. 8. Lean forward, place your hands on the floor, and stretch your arms out in front of you. Rest your head between your arms. 9. Gently push your chest toward the floor, reaching as far in front of you as possible. 10. Hold for 15 to 30 seconds. 11. Repeat 2 to 4 times. Shoulder rolls    1. Sit comfortably with your feet shoulder-width apart. You can also do this exercise while standing. 2. Roll your shoulders up, then back, and then down in a smooth, circular motion. 3. Repeat 2 to 4 times.     Holley Ha push-up    1. Stand against a wall with your feet about 12 to 24 inches back from the wall. If you feel any pain when you do this exercise, stand closer to the wall. 2. Place your hands on the wall slightly wider apart than your shoulders, and lean forward. 3. Gently lean your body toward the wall. Then push back to your starting position. Keep the motion smooth and controlled. 4. Repeat 8 to 12 times. Resisted shoulder blade squeeze    1. Sit or stand, holding the band in both hands in front of you. Keep your elbows close to your sides, bent at a 90-degree angle. Your palms should face up. 2. Squeeze your shoulder blades together, and move your arms to the outside, stretching the band. Be sure to keep your elbows at your sides while you do this. 3. Relax. 4. Repeat 8 to 12 times. Resisted rows    1. Put the band around a solid object, such as a bedpost, at about waist level. Hold one end of the band in each hand. 2. With your elbows at your sides and bent to 90 degrees, pull the band back to move your shoulder blades toward each other. Return to the starting position. 3. Repeat 8 to 12 times. Follow-up care is a key part of your treatment and safety. Be sure to make and go to all appointments, and call your doctor if you are having problems. It's also a good idea to know your test results and keep a list of the medicines you take. Where can you learn more? Go to http://murali-irina.info/. Enter R035 in the search box to learn more about \"Healthy Upper Back: Exercises. \"  Current as of: June 26, 2019  Content Version: 12.2  © 1791-4947 [employer]. Care instructions adapted under license by [employer] (which disclaims liability or warranty for this information).  If you have questions about a medical condition or this instruction, always ask your healthcare professional. [address] any warranty or liability for your use of this information.

## 2020-03-12 ENCOUNTER — OFFICE VISIT (OUTPATIENT)
Dept: ORTHOPEDIC SURGERY | Age: 70
End: 2020-03-12

## 2020-03-12 VITALS
HEIGHT: 70 IN | WEIGHT: 159 LBS | DIASTOLIC BLOOD PRESSURE: 71 MMHG | BODY MASS INDEX: 22.76 KG/M2 | RESPIRATION RATE: 16 BRPM | TEMPERATURE: 98.9 F | OXYGEN SATURATION: 96 % | SYSTOLIC BLOOD PRESSURE: 120 MMHG | HEART RATE: 89 BPM

## 2020-03-12 DIAGNOSIS — M79.18 MYOFASCIAL PAIN: ICD-10-CM

## 2020-03-12 DIAGNOSIS — M47.816 LUMBAR SPONDYLOSIS: ICD-10-CM

## 2020-03-12 DIAGNOSIS — M51.34 DDD (DEGENERATIVE DISC DISEASE), THORACIC: Primary | ICD-10-CM

## 2020-03-12 DIAGNOSIS — M35.3 PMR (POLYMYALGIA RHEUMATICA) (HCC): ICD-10-CM

## 2020-03-12 RX ORDER — PREDNISONE 5 MG/1
TABLET ORAL
Refills: 0 | COMMUNITY
Start: 2019-12-04

## 2020-03-12 NOTE — PATIENT INSTRUCTIONS
Low Back Arthritis: Exercises Introduction Here are some examples of typical rehabilitation exercises for your condition. Start each exercise slowly. Ease off the exercise if you start to have pain. Your doctor or physical therapist will tell you when you can start these exercises and which ones will work best for you. When you are not being active, find a comfortable position for rest. Some people are comfortable on the floor or a medium-firm bed with a small pillow under their head and another under their knees. Some people prefer to lie on their side with a pillow between their knees. Don't stay in one position for too long. Take short walks (10 to 20 minutes) every 2 to 3 hours. Avoid slopes, hills, and stairs until you feel better. Walk only distances you can manage without pain, especially leg pain. How to do the exercises Pelvic tilt 1. Lie on your back with your knees bent. 2. \"Brace\" your stomachtighten your muscles by pulling in and imagining your belly button moving toward your spine. 3. Press your lower back into the floor. You should feel your hips and pelvis rock back. 4. Hold for 6 seconds while breathing smoothly. 5. Relax and allow your pelvis and hips to rock forward. 6. Repeat 8 to 12 times. Back stretches 1. Get down on your hands and knees on the floor. 2. Relax your head and allow it to droop. Round your back up toward the ceiling until you feel a nice stretch in your upper, middle, and lower back. Hold this stretch for as long as it feels comfortable, or about 15 to 30 seconds. 3. Return to the starting position with a flat back while you are on your hands and knees. 4. Let your back sway by pressing your stomach toward the floor. Lift your buttocks toward the ceiling. 5. Hold this position for 15 to 30 seconds. 6. Repeat 2 to 4 times. Follow-up care is a key part of your treatment and safety.  Be sure to make and go to all appointments, and call your doctor if you are having problems. It's also a good idea to know your test results and keep a list of the medicines you take. Where can you learn more? Go to http://murali-irina.info/ Enter V609 in the search box to learn more about \"Low Back Arthritis: Exercises. \" Current as of: June 26, 2019Content Version: 12.4 © 9224-0398 [employer]. Care instructions adapted under license by [employer] (which disclaims liability or warranty for this information). If you have questions about a medical condition or this instruction, always ask your healthcare professional. [address] any warranty or liability for your use of this information.

## 2020-03-12 NOTE — LETTER
3/13/20 Patient: Dee Lundberg YOB: 1950 Date of Visit: 3/12/2020 Penny Benoit DO 
[address] [facility] [address] [address] [place] [address] VIA Facsimile: [phone] Dear Penny Benoit DO, Thank you for referring Ms. Rossana Olmedo to [facility] for evaluation. My notes for this consultation are attached. If you have questions, please do not hesitate to call me. I look forward to following your patient along with you. Sincerely, Ramakrishna Licea MD

## 2023-10-01 NOTE — PROGRESS NOTES
[facility]  [address] [place], [address]  Phone: [phone]  Fax: [phone]    Pt's YOB: 1950    ASSESSMENT   Diagnoses and all orders for this visit:    1. DDD (degenerative disc disease), thoracic    2. Lumbar spondylosis    3. Myofascial pain    4. PMR (polymyalgia rheumatica) ([facility])         IMPRESSION AND PLAN:  Henna Baca is a 81 y.o. female with history of lumbar pain and presents to the office today for follow up. She reports constant soreness across the lower back/right buttock. Pt followed up with Dr. Josef Guillen on Monday 03/09/2020 for her polymyalgia rheumatica and notes that she has decreased her prednisone from 10 mg daily to 6 mg QAM and 1 mg QHS. 1) Pt was given information on lumbar arthritis exercises. 2) She will continue with physical therapy. 3) Pt will continue taking prednisone as prescribed by Dr. Josef Guillen. 4) Ms. Pamela Mccollum has a reminder for a \"due or due soon\" health maintenance. I have asked that she contact her primary care provider, Mariano Marquez DO, for follow-up on this health maintenance. 5)  demonstrated consistency with prescribing. Follow-up and Dispositions    · Return in about 3 months (around 6/12/2020) for PT follow up. HISTORY OF PRESENT ILLNESS:  Henna Baca is a 81 y.o. female with history of lumbar pain and presents to the office today for follow up. Pt reports constant soreness across the lower back/right buttock. She likes to go to the [employer] football games but she often experienced increased pain the following day, despite sitting and using the elevator. Pt admits to pain in the posterior aspect of the left knee which worsens with ambulation. She notes that she had a Baker's cyst on the right in the past which improved gradually on its own.  Pt is currently enrolled in physical therapy at [address] [place] [address] in [place] and she notes significant relief with sessions. She followed up with Dr. Sharifa Borja on Monday 03/09/2020 for her polymyalgia rheumatica and notes that she has decreased her prednisone from 10 mg daily to 6 mg QAM and 1 mg QHS. Pt reports that the goal is to decrease her prednisone further to 5 mg daily. She notes that she recently had lab workup which demonstrated a sed rate of 66. Her lab workup also demonstrated a creatinine of 0.7 and GFR of 88. Pt admits to increased pain following Prolia injections. She has a history of breast cancer and is followed by Dr. Herlinda Damon. Pt at this time desires to continue with current care. Pain Scale: 3/10    PCP: Edith Mathews DO     Past Medical History:   Diagnosis Date    Arthritis     Polymyalgia rheumatica    Cancer ([facility] [address]     Breast Cancer - in remission currently    Kidney disease     PMR [facility]polym[facility]algia rheumatica) ([facility])         Social History     Socioeconomic History    Marital status:      Spouse name: Not on file    Number of children: Not on file    Years of education: Not on file    Highest education level: Not on file   Occupational History    Not on file   Social Needs    Financial resource strain: Not on file    Food insecurity     Worry: Not on file     Inability: Not on file    Transportation needs     Medical: Not on file     Non-medical: Not on file   Tobacco Use    Smoking status: Never Smoker    Smokeless tobacco: Never Used   Substance and Sexual Activity    Alcohol use:  Yes    Drug use: Not on file    Sexual activity: Not on file   Lifestyle    Physical activity     Days per week: Not on file     Minutes per session: Not on file    Stress: Not on file   Relationships    Social connections     Talks on phone: Not on file     Gets together: Not on file     Attends Buddhist service: Not on file     Active member of club or organization: Not on file     Attends meetings of clubs or organizations: Not on file     Relationship status: Not on file    Intimate partner violence     Fear of current or ex partner: Not on file     Emotionally abused: Not on file     Physically abused: Not on file     Forced sexual activity: Not on file   Other Topics Concern     Service Not Asked    Blood Transfusions Not Asked    Caffeine Concern Not Asked    Occupational Exposure Not Asked    Hobby Hazards Not Asked    Sleep Concern Not Asked    Stress Concern Not Asked    Weight Concern Not Asked    Special Diet Not Asked    Back Care Not Asked    Exercise Not Asked    Bike Helmet Not Asked   [address] Not Asked    Self-Exams Not Asked   Social History Narrative    Not on file       Current Outpatient Medications   Medication Sig Dispense Refill    predniSONE (DELTASONE) 5 mg tablet   0    cyanocobalamin, vitamin B-12, (B-12 KIT) 1,000 mcg/mL kit by Injection route.  acetaminophen (TYLENOL ARTHRITIS PAIN) 650 mg TbER Take 650 mg by mouth every eight (8) hours.  calcium carbonate-simethicone 750-80 mg chew Take  by mouth.  fluticasone (FLONASE ALLERGY RELIEF) 50 mcg/actuation nasal spray 2 Sprays by Both Nostrils route daily.  denosumab (PROLIA) 60 mg/mL injection 60 mg by SubCUTAneous route.  sodium chloride (ONUR 128) 2 % ophthalmic solution Administer 1 Drop to both eyes as needed.  PITAVASTATIN CALCIUM (LIVALO PO) Take  by mouth.  EXEMESTANE PO Take  by mouth.  MONTELUKAST SODIUM (SINGULAIR PO) Take  by mouth.  TRAZODONE HCL (TRAZODONE PO) Take  by mouth.  Cholecalciferol, Vitamin D3, (VITAMIN D3) 1,000 unit cap Take  by mouth.  predniSONE (DELTASONE) 20 mg tablet Take 15 mg by mouth daily (with breakfast).  ubidecarenone/vitamin E mixed (COQ10  PO) Take  by mouth.  polyethylene glycol 3350 (CLEARLAX PO) Take  by mouth as needed.  ibandronate (BONIVA) 150 mg tablet Take 150 mg by mouth every thirty (30) days.       tamsulosin (FLOMAX) 0.4 mg capsule Take 1 Cap by mouth daily (after dinner). 30 Cap 0       Allergies   Allergen Reactions    Avelox [Moxifloxacin] Unknown (comments)    Estrogens Other (comments)     Estrogen positive    Percocet [Oxycodone-Acetaminophen] Nausea and Vomiting    Sulfa (Sulfonamide Antibiotics) Hives         REVIEW OF SYSTEMS    Constitutional: Negative for fever, chills, or weight change. Respiratory: Negative for cough or shortness of breath. Cardiovascular: Negative for chest pain or palpitations. Gastrointestinal: Negative for acid reflux, change in bowel habits, or constipation. Genitourinary: Negative for dysuria and flank pain. Musculoskeletal: Positive for lumbar pain. Skin: Negative for rash. Neurological: Negative for headaches, dizziness, or numbness. Endo/Heme/Allergies: Negative for increased bruising. Psychiatric/Behavioral: Negative for difficulty with sleep. PHYSICAL EXAMINATION  Visit Vitals  /71 (BP 1 Location: Left arm, BP Patient Position: Sitting)   Pulse 89   Temp 98.9 °F (37.2 °C) (Oral)   Resp 16   Ht 5' 10\" (1.778 m)   Wt 159 lb (72.1 kg)   SpO2 96%   BMI 22.81 kg/m²       Constitutional: Awake, alert, and in no acute distress. Neurological: 1+ symmetrical DTRs in the lower extremities. Sensation to light touch is intact. Skin: warm, dry, and intact. Musculoskeletal: Tight across the upper trapezius with tenderness to palpation. No pain with extension, axial loading, or forward flexion. Decreased range of motion and mild pain with internal rotation of her right hip; good range of motion on the left. Negative straight leg raise bilaterally.      Hip Flex  Quads Hamstrings Ankle DF EHL Ankle PF   Right +4/5 +4/5 +4/5 +4/5 +4/5 +4/5   Left +4/5 +4/5 +4/5 +4/5 +4/5 +4/5     IMAGING:    Thoracic spine MRI from 12/18/2019 was personally reviewed with the patient and demonstrated:  FINDINGS:  Alignment is anatomic.      POSTOP:  None    DISC LEVELS:    At T2-3, T3-4 minimal disc bulging    At T4-5, small posterocentral protruded disc herniation with contact and minimal flattening of the ventral cord but no abnormal cord signal.    At T5-6, minimal posterocentral protruded disc herniation with contact and minimal flattening of the ventral cord but no abnormal cord signal    At T6-7, small left paracentral protruded disc herniation with contact and minimal flattening of the left ventral cord but no abnormal cord signal    At T7-8, small left posterolateral protruded disc herniation with contact and minimal flattening of the left ventral cord but no abnormal cord signal    At T8-9, minimal bilobate left and right posterolateral protruded disc herniation with contact and minimal flattening of right and left ventral lateral cord but no abnormal cord signal    At T9-10,minimal bilobate left and right posterolateral protruded disc herniation with contact but no definite cord deformity    At T10-11, small bilobate left and right posterolateral protruded disc herniation with contact and minimal flattening of right and left ventral lateral cord but no abnormal cord signal    At T11-12, minimal right paracentral protruded disc herniation without cord contact or deformity. At T12-L1, minimal posterocentral protruded disc herniation without cord contact or deformity. Otherwise unremarkable discs and facet joints. No canal stenosis    THORACIC CORD:  Thoracic cord is of otherwise normal caliber and signal.     MISC:  Marrow signal is unremarkable for age and gender. Based on localizer not well defined, cervical discectomy and fusion C5-6, C6-7, with anterior cervical fixation plate [id number]    Paraspinal musculature is unremarkable in caliber and signal.     Normal caliber aorta, as visible.     IMPRESSION:  1.  Multilevel low-grade minimal to small protruded disc herniations, with minimal cord flattening deformity but no abnormal cord signal at any level, further detailed above.       2. C5-6 and C6-7 discectomy and fixation.     Lumbar spine MRI from 09/28/2018 was personally reviewed with the patient and demonstrated:  FINDINGS:     Alignment: There is retrolisthesis of L3 with respect L4 and L4 with respect L5 by 3 mm.     Vertebral bodies: No compression fractures.     L5-S1: Mild intervertebral disc desiccation is seen. A mild disc protrusion extends into the anterior epidural space and contributes to mild flattening of the ventral thecal sac. No displacement emerging S1 nerve roots noted. Mild bilateral facet arthropathy noted. The findings contribute to mild bilateral foraminal narrowing without central stenosis.      L4-5: Retrolisthesis noted. Intervertebral disc desiccation. A mild to moderate diffuse disc protrusion contributes to flattening of the ventral thecal sac. Mild bilateral facet arthropathy noted in addition to flavum ligamentum hypertrophy. The findings contribute to mild to moderate bilateral foraminal stenosis and borderline mild central stenosis as the thecal sac measures 9-10 mm in the AP dimension.      L3-4: Retrolisthesis noted. Mild intervertebral disc narrowing desiccation is seen. A mild to moderate diffuse disc protrusion contributes to flattening of the ventral thecal sac. Mild bilateral facet arthropathy noted. The findings contribute to mild to moderate bilateral foraminal stenosis without significant central stenosis.      L2-3: MiId intervertebral disc narrowing and desiccation is seen. Mild disc bulging is notes. There is a moderate left posterolateral to lateral disc protrusion noted. The above findings extend into the anterior epidural space and contribute to flattening of the central thecal sac greatest anterolaterally on the left. The findings contribute to moderate left lateral recess stenosis. The findings contribute to mild to moderate left foraminal stenosis.  No central spinal contralateral foraminal stenosis is seen.      L1-2: Intervertebral disc narrowing and desiccation is notes. A moderate right posterolateral disc protrusion notes. This extends into the anterior epidural space and contributes to flattening of the ventral thecal sac greatest anterolaterally on the right. The findings contribute to mild to moderate right lateral recess stenosis. No central spinal or foraminal stenosis is noted.     Conus termination: L1-L2     Lower thoracic spine: Within normal limits.     Upper sacrum: Within normal limits.     Miscellaneous: Diverticular of the sigmoid colon noted suggesting sigmoid diverticulosis.      IMPRESSION:  Disc degenerative changes of the lumbar spine described above in detail.     Written by Jose Crisostomo, as dictated by Ranjan Koo MD.  I, Dr. Ranjan Koo confirm that all documentation is accurate. Patent